# Patient Record
Sex: MALE
[De-identification: names, ages, dates, MRNs, and addresses within clinical notes are randomized per-mention and may not be internally consistent; named-entity substitution may affect disease eponyms.]

---

## 2021-06-02 ENCOUNTER — HOSPITAL ENCOUNTER (EMERGENCY)
Dept: HOSPITAL 97 - ER | Age: 21
Discharge: HOME | End: 2021-06-02
Payer: COMMERCIAL

## 2021-06-02 VITALS — OXYGEN SATURATION: 99 % | SYSTOLIC BLOOD PRESSURE: 105 MMHG | TEMPERATURE: 101.4 F | DIASTOLIC BLOOD PRESSURE: 64 MMHG

## 2021-06-02 DIAGNOSIS — U07.1: Primary | ICD-10-CM

## 2021-06-02 LAB — SARS-COV-2 RNA RESP QL NAA+PROBE: POSITIVE

## 2021-06-02 PROCEDURE — 99283 EMERGENCY DEPT VISIT LOW MDM: CPT

## 2021-06-02 PROCEDURE — 0240U: CPT

## 2021-06-02 NOTE — XMS REPORT
Continuity of Care Document

                             Created on:2021



Patient:ROLO MACIAS

Sex:Male

:2000

External Reference #:[id number]





Demographics







                          Address                   [address] [address]

 

                          Home Phone                [phone]

 

                          Mobile Phone              [phone]

 

                          Email Address             NONE

 

                          Preferred Language        en

 

                          Marital Status            Unknown

 

                          Jainism Affiliation     [id number]

 

                          Race                      Unknown

 

                          Additional Race(s)        Unavailable



                                                    White

 

                          Ethnic Group              Unknown









Author







                          Organization              [facility]

t

 

                          Address                   [address]



                                                    [place] [address]

 

                          Phone                     [phone]









Care Team Providers







                    Name                Role                Phone

 

                    Slade Clark DO   Attending Clinician [phone]

 

                    MACK Garcia MD        Attending Clinician [phone]









Problems

This patient has no known problems.



Allergies, Adverse Reactions, Alerts

This patient has no known allergies or adverse reactions.



Medications

This patient has no known medications.



Procedures

This patient has no known procedures.



Encounters







        Start   End     Encounter Admission Attending Care    Care    Encounter 

Source



        Date/Time Date/Time Type    Type    Clinicians Facility Department ID   

   

 

        2021 Patient         [facility]    [id number]

08 



        00:00:00 00:00:00 Outreach         [facility]  PRIMARY [id number]         



                                        [facility]    [id number]         



                                                [place] [phone]         



                                                        388             

 

        2020 Telephone         [facility]    [phone] [id number]

[id number] 



        00:00:00 00:00:00                 [facility] [id number]     

    



                                                [facility]     [id number]         



                                                [place]  [phone]         



                                                        156             

 

        2020 Telephone         [facility]    [phone] [id number]

[id number] 



        00:00:00 00:00:00                 [facility] [id number]     

    



                                                [facility]     [id number]         



                                                [place]  [phone]         



                                                        156             

 

        2020 Telephone         [facility]    [phone] [id number]

[id number] 



        00:00:00 00:00:00                 [facility] [id number]     

    



                                                [facility]     [id number]         



                                                [place]  [phone]         



                                                        156             







Results

This patient has no known results.

## 2021-06-02 NOTE — ER
Nurse's Notes                                                                                     

 [facility]                                                                 

Name: Gume Amos                                                                                 

Age: 21 yrs                                                                                       

Sex: Male                                                                                         

: 2000                                                                                   

MRN: [id number]                                                                                   

Arrival Date: 2021                                                                          

Time: 11:51                                                                                       

Account#: [id number]                                                                            

Bed 24                                                                                            

Private MD:                                                                                       

Diagnosis: Coronavirus infection, unspecified                                                     

                                                                                                  

Presentation:                                                                                     

                                                                                             

12:24 Chief complaint: Patient states: Fever, chills, stuffy nose, and body aches x 2 days.   ph  

      Coronavirus screen: congestion, fever, muscle pain, Client presents with at least one       

      sign or symptom that may indicate coronavirus-19. Standard/surgical mask placed on the      

      client. Provider contacted for isolation considerations. Ebola Screen: No symptoms or       

      risks identified at this time. Initial Sepsis Screen: Does the patient meet any 2           

      criteria? No. Patient's initial sepsis screen is negative. Does the patient have a          

      suspected source of infection? No. Patient's initial sepsis screen is negative. Risk        

      Assessment: Do you want to hurt yourself or someone else? Patient reports no desire to      

      harm self or others. Onset of symptoms was 2021.                                   

12:24 Method Of Arrival: Ambulatory                                                           ph  

12:24 Acuity: BOUBACAR 4                                                                           ph  

                                                                                                  

Historical:                                                                                       

- Allergies:                                                                                      

12:26 No Known Allergies;                                                                     ph  

- PMHx:                                                                                           

12:26 None;                                                                                   ph  

                                                                                                  

- Immunization history:: Adult Immunizations unknown.                                             

- Social history:: Smoking status: Patient denies any tobacco usage or history of.                

                                                                                                  

                                                                                                  

Screenin:18 Abuse screen: Denies threats or abuse. Nutritional screening: No deficits noted.        ap3 

      Tuberculosis screening: No symptoms or risk factors identified. Fall Risk None              

      identified.                                                                                 

                                                                                                  

Assessment:                                                                                       

14:17 General: Appears in no apparent distress. uncomfortable. General: Reports fever for     ap3 

      feeling ill for fatigue for. Pain: Complains of pain in GENERALIZED PAIN AND WEAKNESS.      

      Neuro: Level of Consciousness is awake, alert, obeys commands, Oriented to person,          

      place, time, situation. Cardiovascular: Denies chest pain. Respiratory: Reports cough       

      that is Airway is patent Respiratory effort is even, unlabored, Respiratory pattern is      

      regular, symmetrical. GI: No signs and/or symptoms were reported involving the              

      gastrointestinal system. : No signs and/or symptoms were reported regarding the           

      genitourinary system. Musculoskeletal: Reports weakness in GENERALIZED PAIN AND             

      WEAKNESS.                                                                                   

                                                                                                  

Vital Signs:                                                                                      

12:24  / 64; Pulse 101; Resp 18; Temp 101.4(O); Pulse Ox 99% on R/A; Weight 158.76 kg;  ph  

      Height 6 ft. 7 in. (200.66 cm);                                                             

12:24 Body Mass Index 39.43 (158.76 kg, 200.66 cm)                                            ph  

                                                                                                  

ED Course:                                                                                        

11:51 Patient arrived in ED.                                                                  ds1 

11:52 Elizabteh Smith FNP-C is [facility].                                                        kb  

11:52 Scott Steen MD is Attending Physician.                                            kb  

12:25 Triage completed.                                                                       ph  

12:26 Arm band placed on Patient placed in waiting room, Patient notified of wait time.       ph  

      Antipyretics given from triage as ordered by an ER provider. Labs ordered per protocol.     

      Drawn by ED staff.                                                                          

13:09 Berna Conway, RN is Primary Nurse.                                                    ph  

14:18 Patient has correct armband on for positive identification. Bed in low position. Call   ap3 

      light in reach. Side rails up X 1. Adult w/ patient. Pulse ox on. NIBP on. Door closed.     

      Noise minimized.                                                                            

15:29 No provider procedures requiring assistance completed. Patient did not have IV access   jl7 

      during this emergency room visit.                                                           

                                                                                                  

Administered Medications:                                                                         

12:35 Drug: Tylenol 1000 mg Route: PO;                                                        ph  

13:30 Drug: Ibuprofen 800 mg Route: PO;                                                       ap3 

                                                                                                  

                                                                                                  

Outcome:                                                                                          

14:56 Discharge ordered by MD.                                                                kb  

15:29 Discharged to home ambulatory.                                                          jl7 

15:29 Condition: stable                                                                           

15:29 Discharge instructions given to patient, Instructed on discharge instructions, follow       

      up and referral plans. Demonstrated understanding of instructions, follow-up care.          

15:29 Patient left the ED.                                                                    jl7 

                                                                                                  

Signatures:                                                                                       

Elizabeth Smith FNP-C                 FNP-Radha Pelletier                                ds1                                                  

Berna Conway, RN                      RN   ph                                                   

Jaquan Thomas RN RN   jl7                                                  

Elvie Rain RN RN   ap3                                                  

                                                                                                  

Corrections: (The following items were deleted from the chart)                                    

13:34 13:10 CORONAVIRUS+MR.LAB.BRZ drawn and sent. ph                                         EDMS

13:35 13:10 Influenza Screen (A \T\ B)+BA.LAB.BRZ drawn and sent. ph                            EDMS

                                                                                                  

**************************************************************************************************

## 2021-06-03 NOTE — EDPHYS
Physician Documentation                                                                           

 [facility]                                                                 

Name: Gume Amos                                                                                 

Age: 21 yrs                                                                                       

Sex: Male                                                                                         

: 2000                                                                                   

MRN: [id number]                                                                                   

Arrival Date: 2021                                                                          

Time: 11:51                                                                                       

Account#: [id number]                                                                            

Bed 24                                                                                            

Private MD:                                                                                       

ED Physician Scott Steen                                                                     

HPI:                                                                                              

                                                                                             

16:54 This 21 yrs old  Male presents to ER via Ambulatory with complaints of Fever,   kb  

      Runny Nose, Body Aches.                                                                     

16:54 The patient or guardian reports flu symptoms, low-grade fever. Onset: The               kb  

      symptoms/episode began/occurred yesterday. Severity of symptoms: At their worst the         

      symptoms were moderate, in the emergency department the symptoms are unchanged.             

      Modifying factors: The symptoms are alleviated by nothing, the symptoms are aggravated      

      by nothing. Associated signs and symptoms: Pertinent positives: fever, congestion,          

      headache. The patient has not experienced similar symptoms in the past. The patient has     

      not recently seen a physician. pt reports fever, congestion and headache since              

      yesterday.                                                                                  

                                                                                                  

Historical:                                                                                       

- Allergies:                                                                                      

12:26 No Known Allergies;                                                                     ph  

- PMHx:                                                                                           

12:26 None;                                                                                   ph  

                                                                                                  

- Immunization history:: Adult Immunizations unknown.                                             

- Social history:: Smoking status: Patient denies any tobacco usage or history of.                

                                                                                                  

                                                                                                  

ROS:                                                                                              

16:55 Respiratory: Negative for shortness of breath, cough, wheezing, and pleuritic chest     kb  

      pain.                                                                                       

16:55 Constitutional: Positive for fever.                                                         

16:55 ENT: Positive for rhinorrhea, sinus congestion.                                             

16:55 Neuro: Positive for headache.                                                               

16:55 All other systems are negative.                                                             

                                                                                                  

Exam:                                                                                             

16:55 Constitutional:  This is a well developed, well nourished patient who is awake, alert,  kb  

      and in no acute distress. Head/Face:  Normocephalic, atraumatic. ENT:  Moist Mucous         

      membranes Respiratory:  Respirations even and unlabored. No increased work of               

      breathing, no retractions or nasal flaring. Skin:  Warm, dry with normal turgor.            

      Normal color. MS/ Extremity:  Pulses equal, no cyanosis.  Neurovascular intact.  Full,      

      normal range of motion. Neuro:  Awake and alert, GCS 15, oriented to person, place,         

      time, and situation. Moves all extremities. Normal gait. Psych:  Awake, alert, with         

      orientation to person, place and time.  Behavior, mood, and affect are within normal        

      limits.                                                                                     

                                                                                                  

Vital Signs:                                                                                      

12:24  / 64; Pulse 101; Resp 18; Temp 101.4(O); Pulse Ox 99% on R/A; Weight 158.76 kg;  ph  

      Height 6 ft. 7 in. (200.66 cm);                                                             

12:24 Body Mass Index 39.43 (158.76 kg, 200.66 cm)                                            ph  

                                                                                                  

MDM:                                                                                              

13:22 Patient medically screened.                                                             kb  

16:53 Data reviewed: vital signs, nurses notes. Data interpreted: Pulse oximetry: on room air kb  

      is 99 %. Interpretation: normal. Counseling: I had a detailed discussion with the           

      patient and/or guardian regarding: the historical points, exam findings, and any            

      diagnostic results supporting the discharge/admit diagnosis, lab results, the need for      

      outpatient follow up, a family practitioner, to return to the emergency department if       

      symptoms worsen or persist or if there are any questions or concerns that arise at home.    

                                                                                                  

                                                                                             

14:56 Order name: COVID-19/FLU A+B; Complete Time: 15:00                                      EDMS

                                                                                                  

Administered Medications:                                                                         

12:35 Drug: Tylenol 1000 mg Route: PO;                                                        ph  

13:30 Drug: Ibuprofen 800 mg Route: PO;                                                       ap3 

                                                                                                  

                                                                                                  

Disposition:                                                                                      

18:32 Co-signature as Attending Physician, Scott Steen MD I agree with the assessment and tw4 

      plan of care.                                                                               

                                                                                                  

Disposition:                                                                                      

21 14:56 Discharged to Home. Impression: Coronavirus infection, unspecified.                

- Condition is Stable.                                                                            

- Discharge Instructions: Viral Respiratory Infection, Easy-To-Read, COVID-19.                    

                                                                                                  

- Medication Reconciliation Form, Thank You Letter, Antibiotic Education, Prescription            

  Opioid Use form.                                                                                

- Follow up: Emergency Department; When: As needed; Reason: Worsening of condition.               

  Follow up: Private Physician; When: 2 - 3 days; Reason: Recheck today's complaints,             

  Continuance of care, Re-evaluation by your physician.                                           

                                                                                                  

                                                                                                  

                                                                                                  

Signatures:                                                                                       

Dispatcher [employer]                           Elizabeth Thomas, KARMEN MITCHELL-Berna Decker, RN                      RN   ph                                                   

Jaquan Thomas, RN                        RN   jl7                                                  

Scott Steen MD MD   tw4                                                  

Elvie Rain RN                    RN   ap3                                                  

                                                                                                  

Corrections: (The following items were deleted from the chart)                                    

13:34 11:52 CORONAVIRUS+MR.LAB.BRZ ordered. [facility]

13:35 11:52 Influenza Screen (A \T\ B)+BA.LAB.BRZ ordered. [facility]                                 EDMS

15:29 14:56 2021 14:56 Discharged to Home. Impression: Coronavirus infection,           jl7 

      unspecified. Condition is Stable. Forms are Medication Reconciliation Form, Thank You       

      Letter, Antibiotic Education, Prescription Opioid Use. Follow up: Emergency Department;     

      When: As needed; Reason: Worsening of condition. Follow up: Private Physician; When: 2      

      - 3 days; Reason: Recheck today's complaints, Continuance of care, Re-evaluation by         

      your physician. kb                                                                          

                                                                                                  

**************************************************************************************************

## 2021-06-14 ENCOUNTER — HOSPITAL ENCOUNTER (EMERGENCY)
Dept: HOSPITAL 97 - ER | Age: 21
Discharge: HOME | End: 2021-06-14
Payer: COMMERCIAL

## 2021-06-14 VITALS — SYSTOLIC BLOOD PRESSURE: 147 MMHG | OXYGEN SATURATION: 98 % | TEMPERATURE: 97.8 F | DIASTOLIC BLOOD PRESSURE: 82 MMHG

## 2021-06-14 DIAGNOSIS — Z86.16: ICD-10-CM

## 2021-06-14 DIAGNOSIS — Z53.29: ICD-10-CM

## 2021-06-14 DIAGNOSIS — Z20.822: Primary | ICD-10-CM

## 2021-06-14 PROCEDURE — 99281 EMR DPT VST MAYX REQ PHY/QHP: CPT

## 2021-06-14 NOTE — EDPHYS
Physician Documentation                                                                           

 [facility]                                                                 

Name: Gume Amos                                                                                 

Age: 21 yrs                                                                                       

Sex: Male                                                                                         

: 2000                                                                                   

MRN: [id number]                                                                                   

Arrival Date: 2021                                                                          

Time: 14:03                                                                                       

Account#: [id number]                                                                            

Bed 12                                                                                            

Private MD:                                                                                       

ED Physician Olvin Waldron                                                                      

HPI:                                                                                              

                                                                                             

20:13 This 21 yrs old  Male presents to ER via Ambulatory with complaints of COVID    kb  

      Test.                                                                                       

20:13 Pt reports he tested positive 1-2 weeks ago. States his symptoms have resolved so he    kb  

      came in today to get another test to make sure it was negative now. Onset: The              

      symptoms/episode began/occurred 2 week(s) ago. Severity of symptoms: At their worst the     

      symptoms were mild in the emergency department the symptoms have resolved. The patient      

      has not experienced similar symptoms in the past. The patient has not recently seen a       

      physician.                                                                                  

                                                                                                  

Historical:                                                                                       

- Allergies:                                                                                      

14:10 No Known Allergies;                                                                     ss  

- Home Meds:                                                                                      

14:10 None [Active];                                                                          ss  

- PMHx:                                                                                           

14:10 None;                                                                                   ss  

- PSHx:                                                                                           

14:10 None;                                                                                   ss  

                                                                                                  

- Immunization history:: Adult Immunizations unknown.                                             

- Social history:: Smoking status: Patient denies any tobacco usage or history of.                

                                                                                                  

                                                                                                  

ROS:                                                                                              

20:13 Constitutional: Negative for fever, chills, and weight loss.                            kb  

20:13 All other systems are negative.                                                             

                                                                                                  

Exam:                                                                                             

20:13 Constitutional:  This is a well developed, well nourished patient who is awake, alert,  kb  

      and in no acute distress. Head/Face:  Normocephalic, atraumatic. ENT:  Moist Mucous         

      membranes Respiratory:  Respirations even and unlabored. No increased work of               

      breathing, no retractions or nasal flaring. Skin:  Warm, dry with normal turgor.            

      Normal color. MS/ Extremity:  Pulses equal, no cyanosis.  Neurovascular intact.  Full,      

      normal range of motion. Neuro:  Awake and alert, GCS 15, oriented to person, place,         

      time, and situation. Moves all extremities. Normal gait. Psych:  Awake, alert, with         

      orientation to person, place and time.  Behavior, mood, and affect are within normal        

      limits.                                                                                     

                                                                                                  

Vital Signs:                                                                                      

14:09  / 82; Pulse 75; Resp 15; Temp 97.8(TE); Pulse Ox 98% on R/A; Pain 0/10;          ss  

                                                                                                  

MDM:                                                                                              

14:13 Patient medically screened.                                                             kb  

20:12 Data reviewed: vital signs, nurses notes. Data interpreted: Pulse oximetry: on room air kb  

      is 98 %. Interpretation: normal. Counseling: I had a detailed discussion with the           

      patient and/or guardian regarding: the historical points, exam findings, and any            

      diagnostic results supporting the discharge/admit diagnosis, the need for outpatient        

      follow up, a family practitioner, to return to the emergency department if symptoms         

      worsen or persist or if there are any questions or concerns that arise at home.             

                                                                                                  

Administered Medications:                                                                         

No medications were administered                                                                  

                                                                                                  

                                                                                                  

Disposition:                                                                                      

14:24 Encounter for repeat covid test.                                                        kb  

06/15                                                                                             

07:14 Co-signature as Attending Physician, Olvin Waldron MD I agree with the assessment and  loni 

      plan of care.                                                                               

                                                                                                  

Disposition:                                                                                      

21 14:24 Discharged to Home. Impression: Encounter for screening, unspecified.              

- Condition is Stable.                                                                            

                                                                                                  

                                                                                                  

- Medication Reconciliation Form, Thank You Letter, Antibiotic Education, Prescription            

  Opioid Use form.                                                                                

- Follow up: Emergency Department; When: As needed; Reason: Worsening of condition.               

  Follow up: Private Physician; When: 2 - 3 days; Reason: Recheck today's complaints,             

  Continuance of care, Re-evaluation by your physician.                                           

                                                                                                  

                                                                                                  

                                                                                                  

Signatures:                                                                                       

Elizabeth Smith, FNP-C                 FNP-Ckb                                                   

Olvin Waldron MD MD cha Smirch, Shelby, RN                      RN   ss                                                   

                                                                                                  

Corrections: (The following items were deleted from the chart)                                    

                                                                                             

14:33 14:24 2021 14:24 Discharged to Home. Impression: Encounter for screening,         ss  

      unspecified. Condition is Stable. Forms are Medication Reconciliation Form, Thank You       

      Letter, Antibiotic Education, Prescription Opioid Use. Follow up: Emergency Department;     

      When: As needed; Reason: Worsening of condition. Follow up: Private Physician; When: 2      

      - 3 days; Reason: Recheck today's complaints, Continuance of care, Re-evaluation by         

      your physician. kb                                                                          

                                                                                                  

**************************************************************************************************

## 2021-06-14 NOTE — ER
Nurse's Notes                                                                                     

 [facility]                                                                 

Name: Gume Amos                                                                                 

Age: 21 yrs                                                                                       

Sex: Male                                                                                         

: 2000                                                                                   

MRN: [id number]                                                                                   

Arrival Date: 2021                                                                          

Time: 14:03                                                                                       

Account#: [id number]                                                                            

Bed 12                                                                                            

Private MD:                                                                                       

Diagnosis: Encounter for screening, unspecified                                                   

                                                                                                  

Presentation:                                                                                     

                                                                                             

14:09 Chief complaint: Patient states: "I had COVID a while back and I just need to see if I  ss  

      still have it." PT has no complaints at this time. Coronavirus screen: Client denies        

      travel out of the [place] in the last 14 days. Ebola Screen: Patient denies exposure to        

      infectious person. Patient denies travel to an Ebola-affected area in the 21 days           

      before illness onset. Initial Sepsis Screen: Does the patient meet any 2 criteria? No.      

      Patient's initial sepsis screen is negative. Does the patient have a suspected source       

      of infection? No. Patient's initial sepsis screen is negative. Risk Assessment: Do you      

      want to hurt yourself or someone else? Patient reports no desire to harm self or            

      others. Onset of symptoms is unknown.                                                       

14:09 Method Of Arrival: Ambulatory                                                           ss  

14:09 Acuity: BOUBACAR 5                                                                           ss  

                                                                                                  

Historical:                                                                                       

- Allergies:                                                                                      

14:10 No Known Allergies;                                                                     ss  

- Home Meds:                                                                                      

14:10 None [Active];                                                                          ss  

- PMHx:                                                                                           

14:10 None;                                                                                   ss  

- PSHx:                                                                                           

14:10 None;                                                                                   ss  

                                                                                                  

- Immunization history:: Adult Immunizations unknown.                                             

- Social history:: Smoking status: Patient denies any tobacco usage or history of.                

                                                                                                  

                                                                                                  

Screenin:11 Abuse screen: Denies threats or abuse. Denies injuries from another. Nutritional        ss  

      screening: No deficits noted. Tuberculosis screening: Never had TB. Fall Risk None          

      identified.                                                                                 

                                                                                                  

Vital Signs:                                                                                      

14:09  / 82; Pulse 75; Resp 15; Temp 97.8(TE); Pulse Ox 98% on R/A; Pain 0/10;          ss  

                                                                                                  

ED Course:                                                                                        

14:03 Patient arrived in ED.                                                                  mr  

14:10 Triage completed.                                                                       ss  

14:10 Arm band placed on right wrist.                                                         ss  

14:11 Patient has correct armband on for positive identification. Bed in low position. Call   ss  

      light in reach.                                                                             

14:13 [facility]                                                        kb  

14:13 Olvin Waldron MD is Attending Physician.                                             kb  

14:32 Lulú Perrin, RN is Primary Nurse.                                                    ss  

14:32 No provider procedures requiring assistance completed. Patient did not have IV access   ss  

      during this emergency room visit.                                                           

                                                                                                  

Administered Medications:                                                                         

No medications were administered                                                                  

                                                                                                  

                                                                                                  

Outcome:                                                                                          

14:24 Discharge ordered by MD.                                                                kb  

14:32 Medical screen evaluation completed per provider. Patient declined treatment.           ss  

14:32 Condition: good                                                                             

14:32 Discharge instructions given to patient, Instructed on discharge instructions, follow       

      up and referral plans. Demonstrated understanding of instructions, follow-up care.          

14:33 Patient left the ED.                                                                    ss  

                                                                                                  

Signatures:                                                                                       

Elizabeth Smith, FNP-C                 FNP-Ckb                                                   

Tiarra Alexander                                                   

Lulú Perrin, RN                      RN   ss                                                   

                                                                                                  

Corrections: (The following items were deleted from the chart)                                    

14:11 14:09  / 82; ss                                                                   ss  

14:11 14:11 No provider procedures requiring assistance completed. ss                         ss  

14:11 14:11 Patient did not have IV access during this emergency room visit. ss               ss  

                                                                                                  

**************************************************************************************************

## 2022-01-07 ENCOUNTER — HOSPITAL ENCOUNTER (EMERGENCY)
Dept: HOSPITAL 97 - ER | Age: 22
Discharge: HOME | End: 2022-01-07
Payer: COMMERCIAL

## 2022-01-07 VITALS — DIASTOLIC BLOOD PRESSURE: 108 MMHG | OXYGEN SATURATION: 100 % | TEMPERATURE: 98.9 F | SYSTOLIC BLOOD PRESSURE: 160 MMHG

## 2022-01-07 DIAGNOSIS — U07.1: Primary | ICD-10-CM

## 2022-01-07 LAB — SARS-COV-2 RNA RESP QL NAA+PROBE: POSITIVE

## 2022-01-07 PROCEDURE — 99281 EMR DPT VST MAYX REQ PHY/QHP: CPT

## 2022-01-07 PROCEDURE — 87070 CULTURE OTHR SPECIMN AEROBIC: CPT

## 2022-01-07 PROCEDURE — 87081 CULTURE SCREEN ONLY: CPT

## 2022-01-07 PROCEDURE — 0240U: CPT

## 2022-01-07 NOTE — ER
Nurse's Notes                                                                                     

 [facility]                                                                 

Name: Gume Amos                                                                                 

Age: 21 yrs                                                                                       

Sex: Male                                                                                         

: 2000                                                                                   

MRN: [id number]                                                                                   

Arrival Date: 2022                                                                          

Time: 17:33                                                                                       

Account#: [id number]                                                                            

Bed DIS1                                                                                          

Private MD:                                                                                       

Diagnosis: SARS-associated coronavirus as the cause of diseases classified elsewhere              

                                                                                                  

Presentation:                                                                                     

                                                                                             

17:33 Chief complaint: Patient states: for about 2 days. my throat is sore. i have a          tw2 

      headache. runny nose as well. one episode of diarrhea. Coronavirus screen: diarrhea,        

      runny nose, sore throat, Client presents with at least one sign or symptom that may         

      indicate coronavirus-19. Standard/surgical mask placed on the client. Provider              

      contacted for isolation considerations. Ebola Screen: Patient denies travel to an           

      Ebola-affected area in the 21 days before illness onset. Initial Sepsis Screen: Does        

      the patient meet any 2 criteria? No. Patient's initial sepsis screen is negative. Does      

      the patient have a suspected source of infection? No. Patient's initial sepsis screen       

      is negative. Risk Assessment: Do you want to hurt yourself or someone else? Patient         

      reports no desire to harm self or others. Onset of symptoms was 2022.           

17:33 Method Of Arrival: Ambulatory                                                           tw2 

17:33 Acuity: BOUBACAR 4                                                                           tw2 

                                                                                                  

Triage Assessment:                                                                                

17:36 General: Appears in no apparent distress. obese, well groomed, Behavior is calm,        tw2 

      cooperative, appropriate for age. Pain: Complains of pain in uvula, left aspect of          

      posterior pharynx and right aspect of posterior pharynx.                                    

                                                                                                  

Historical:                                                                                       

- Allergies:                                                                                      

17:35 No Known Allergies;                                                                     tw2 

- Home Meds:                                                                                      

17:35 None [Active];                                                                          tw2 

- PMHx:                                                                                           

17:35 None;                                                                                   tw2 

- PSHx:                                                                                           

17:35 Appendectomy;                                                                           tw2 

                                                                                                  

- Immunization history:: Client reports having NOT received the Covid vaccine.                    

- Social history:: Smoking status: Patient denies any tobacco usage or history of.                

                                                                                                  

                                                                                                  

Screenin:43 Abuse screen: Denies threats or abuse. Nutritional screening: No deficits noted.        vc1 

      Tuberculosis screening: No symptoms or risk factors identified. Fall Risk None              

      identified.                                                                                 

                                                                                                  

Assessment:                                                                                       

20:43 Reassessment: Patient and/or family updated on plan of care and expected duration. Pain vc1 

      level reassessed. Patient is alert, oriented x 3, equal unlabored respirations, skin        

      warm/dry/pink. Patient states symptoms have not improved. General: Appears in no            

      apparent distress. ill, Behavior is calm, cooperative, appropriate for age. Pain:           

      Complains of pain in mouth and right aspect of posterior pharynx and left aspect of         

      posterior pharynx. Neuro: No deficits noted. Respiratory: No deficits noted. EENT:          

      Reports nasal congestion nasal discharge pain when swallowing.                              

                                                                                                  

Vital Signs:                                                                                      

17:33  / 108; Pulse 95; Resp 18; Temp 98.9(O); Pulse Ox 100% on R/A;                    tw2 

                                                                                                  

ED Course:                                                                                        

17:33 Patient arrived in ED.                                                                  as  

17:35 Triage completed.                                                                       tw2 

17:36 Arm band placed on.                                                                     tw2 

19:14 Olvin Guillaume PA is PHCP.                                                                cp  

19:14 Ravi Caicedo MD is Attending Physician.                                              cp  

20:44 Patient has correct armband on for positive identification.                             vc1 

21:17 No provider procedures requiring assistance completed. Patient did not have IV access   vc1 

      during this emergency room visit.                                                           

                                                                                                  

Administered Medications:                                                                         

No medications were administered                                                                  

                                                                                                  

                                                                                                  

Outcome:                                                                                          

21:13 Discharge ordered by MD.                                                                cp  

21:18 Discharged to home ambulatory, with family.                                             vc1 

21:18 Condition: good                                                                             

21:18 Discharge instructions given to patient, family, Instructed on discharge instructions,      

      follow up and referral plans. medication usage, Demonstrated understanding of               

      instructions, follow-up care, medications.                                                  

21:18 Patient left the ED.                                                                    vc1 

                                                                                                  

Signatures:                                                                                       

Lolly Sorenson                             as                                                   

Olvin Guillaume PA PA cp Wise, Tara, RN                          RN   tw2                                                  

Yamliet Wodos RN                    RN   vc1                                                  

                                                                                                  

**************************************************************************************************

## 2022-01-07 NOTE — XMS REPORT
Continuity of Care Document

                           Created on:2022



Patient:ROLO MACIAS

Sex:Male

:2000

External Reference #:[id number]





Demographics







                          Address                   [address], [place] [address]

 

                          Home Phone                [phone]

 

                          Mobile Phone              [phone]

 

                          Email Address             [email or web address]

 

                          Preferred Language        en

 

                          Marital Status            Unknown

 

                          Taoist Affiliation     [id number]

 

                          Race                      Unknown

 

                          Additional Race(s)        Unavailable



                                                    White

 

                          Ethnic Group              Unknown









Author







                          Organization              [facility]

t

 

                          Address                   [address]



                                                    [place] [address]

 

                          Phone                     [phone]









Care Team Providers







                    Name                Role                Phone

 

                    Slade Clark DO   Attending Clinician [phone]

 

                    MACK Garcia MD        Attending Clinician [phone]

 

                    MACK GARCIA           Attending Clinician Unavailable









Payers







           Payer Name Policy Type Policy Number Effective Date Expiration Date [employer]            [id number]  [id number]            



           HEALTH                           00:00:00              







Problems

This patient has no known problems.



Allergies, Adverse Reactions, Alerts







       Allergy Allergy Status Severity Reaction(s) Onset  Inactive Treating Comm

ents 

Source



       Name   Type                        Date   Date   Clinician        

 

       NO KNOWN Drug   Active                                           [facility]



       ALLERGIE Class                                                   ity of



       S                                                              [facility]







Medications

This patient has no known medications.



Procedures

This patient has no known procedures.



Encounters







        Start   End     Encounter Admission Attending Care    Care    Encounter 

Source



        Date/Time Date/Time Type    Type    Clinicians Facility Department ID   

   

 

        2021 Patient         Eduardo  [facility]    [id number]

08 



        00:00:00 00:00:00 Outreach         Mikhail  PRIMARY [id number]         



                                        Slade  [facility]    [id number]         



                                                [place] [phone]         



                                                        388             

 

        2020 [facility]    [phone]

[id number] 



        00:00:00 00:00:00                 [facility] [id number]     

    



                                                BAY     [id number]         



                                                [place]  [phone]         



                                                        156             

 

        2020 Telephone         [facility]    [phone]

[id number] 



        00:00:00 00:00:00                 [facility] [id number]     

    



                                                BAY     [id number]         



                                                [place]  [phone]         



                                                        156             

 

        2020 Telephone         Jose    [facility]    [phone]

[id number] 



        00:00:00 00:00:00                 Uzair MEAD [facility] [id number]     

    



                                                BAY     [id number]         



                                                [place]  [phone]         



                                                        156             

 

        2020 Outpatient R       JOSE    [facility]    [id number]

247 [facility]



        08:10:00 08:10:00                 UZAIR salter of



                                                                        [facility]







Results

This patient has no known results.

## 2022-01-07 NOTE — EDPHYS
Physician Documentation                                                                           

 [facility]                                                                 

Name: Gume Amos                                                                                 

Age: 21 yrs                                                                                       

Sex: Male                                                                                         

: 2000                                                                                   

MRN: [id number]                                                                                   

Arrival Date: 2022                                                                          

Time: 17:33                                                                                       

Account#: [id number]                                                                            

Bed DIS1                                                                                          

Private MD:                                                                                       

ED Physician Ravi Caicedo                                                                       

HPI:                                                                                              

                                                                                             

21:00 This 21 yrs old  Male presents to ER via Ambulatory with complaints of Sore     cp  

      Throat, Runny Nose, Headache.                                                               

21:00 The patient or guardian reports cough, that is intermittent. Onset: The                 cp  

      symptoms/episode began/occurred 2 day(s) ago. Associated signs and symptoms: Pertinent      

      positives: fever, sore throat, headache, Pertinent negatives: chest pain, diarrhea,         

      vomiting. Patient reports he is not vaccinated against COVID-19.                            

                                                                                                  

Historical:                                                                                       

- Allergies:                                                                                      

17:35 No Known Allergies;                                                                     tw2 

- Home Meds:                                                                                      

17:35 None [Active];                                                                          tw2 

- PMHx:                                                                                           

17:35 None;                                                                                   tw2 

- PSHx:                                                                                           

17:35 Appendectomy;                                                                           tw2 

                                                                                                  

- Immunization history:: Client reports having NOT received the Covid vaccine.                    

- Social history:: Smoking status: Patient denies any tobacco usage or history of.                

                                                                                                  

                                                                                                  

ROS:                                                                                              

21:05 Constitutional: Negative for fever, poor PO intake.                                     cp  

21:05 Eyes: Negative for injury, pain, redness, and discharge.                                cp  

21:05 ENT: Positive for rhinorrhea, sore throat, Negative for drainage from ear(s), ear pain,     

      difficulty swallowing, difficulty handling secretions.                                      

21:05 Respiratory: Positive for cough, Negative for shortness of breath, wheezing.                

21:05 Neuro: Positive for headache, Negative for altered mental status, weakness.                 

21:05 All other systems are negative.                                                             

                                                                                                  

Exam:                                                                                             

21:08 Constitutional: The patient appears in no acute distress, alert, awake, non-toxic, well cp  

      developed, well nourished, obese.                                                           

21:08 Head/Face:  Normocephalic, atraumatic.                                                  cp  

21:08 Eyes: Periorbital structures: appear normal, Conjunctiva: normal, no exudate, no            

      injection, Sclera: no appreciated abnormality, Lids and lashes: appear normal,              

      bilaterally.                                                                                

21:08 ENT: External ear(s): are unremarkable, Ear canal(s): are normal, clear, TM's:              

      dullness, bilaterally, Nose: is normal, Mouth: Lips: moist, Oral mucosa: moist,             

      Posterior pharynx: Airway: no evidence of obstruction, patent, Tonsils: no enlargement,     

      no exudate, erythema, that is mild, exudate, is not appreciated.                            

21:08 Neck: ROM/movement: is normal, is supple, no meningismus, no nuchal rigidity, Lymph         

      nodes: no appreciated lymphadenopathy.                                                      

21:08 Chest/axilla: Inspection: normal.                                                           

21:08 Cardiovascular: Rate: normal, Rhythm: regular.                                              

21:08 Respiratory: the patient does not display signs of respiratory distress,  Respirations:     

      normal, no use of accessory muscles, no retractions, labored breathing, is not present,     

      Breath sounds: are clear throughout, no decreased breath sounds, no stridor, no             

      wheezing.                                                                                   

21:08 Abdomen/GI: Exam negative for discomfort, distension, guarding, Inspection: abdomen         

      appears normal.                                                                             

21:08 Neuro: Orientation: Mentation: is normal, Motor: moves all fours, strength is normal,       

      Sensation: is normal.                                                                       

                                                                                                  

Vital Signs:                                                                                      

17:33  / 108; Pulse 95; Resp 18; Temp 98.9(O); Pulse Ox 100% on R/A;                    tw2 

                                                                                                  

MDM:                                                                                              

20:44 Patient medically screened.                                                             cp  

21:10 Differential Diagnosis: Bronchitis Influenza Sinusitis Pharyngitis Otitis Media         cp  

      Pneumonia Other strep throat, COVID-19.                                                     

21:13 Data reviewed: vital signs, nurses notes, lab test result(s), and as a result, I will   cp  

      discharge patient.                                                                          

21:13 Counseling: I had a detailed discussion with the patient and/or guardian regarding: the cp  

      historical points, exam findings, and any diagnostic results supporting the                 

      discharge/admit diagnosis, lab results, to return to the emergency department if            

      symptoms worsen or persist or if there are any questions or concerns that arise at          

      home. ED course: VSS. Patient appears non-toxic and no signs of respiratory distress.       

      Will discharge to home for continued monitoring.                                            

                                                                                                  

                                                                                             

17:37 Order name: COVID-19/FLU A+B (Document "Date of Onset" if Symptomatic)                  tw2 

                                                                                             

17:38 Order name: Strep; Complete Time: 21:02                                                 tw2 

                                                                                             

17:38 Order name: COVID-19/FLU A+B; Complete Time: 21:02                                      EDMS

                                                                                             

18:16 Order name: Throat Culture                                                              EDMS

                                                                                                  

Administered Medications:                                                                         

No medications were administered                                                                  

                                                                                                  

                                                                                                  

Disposition:                                                                                      

                                                                                             

02:48 Co-signature as Attending Physician, Ravi Caicedo MD.                                  kdr 

                                                                                                  

Disposition Summary:                                                                              

22 21:13                                                                                    

Discharge Ordered                                                                                 

      Location: Home                                                                          cp  

      Problem: new                                                                            cp  

      Symptoms: are unchanged                                                                 cp  

      Condition: Stable                                                                       cp  

      Diagnosis                                                                                   

        - SARS-associated coronavirus as the cause of diseases classified elsewhere           cp  

      Followup:                                                                               cp  

        - With: Private Physician                                                                  

        - When: 2 - 3 days                                                                         

        - Reason: Worsening of condition                                                           

      Discharge Instructions:                                                                     

        - Discharge Summary Sheet                                                             cp  

        - Aspirin and Your Heart                                                              cp  

        - COVID-19                                                                            cp  

        - Things to Know about the COVID-19 Pandemic - [facility]                                    cp  

        - 10 Things You Can Do to Manage Your COVID-19 Symptoms at Home - [facility]                 cp  

        - COVID-19: Quarantine vs. Isolation - [facility]                                            cp  

        - Prevent the Spread of COVID-19 if You Are Sick - [facility]                                cp  

      Forms:                                                                                      

        - Medication Reconciliation Form                                                      cp  

        - Thank You Letter                                                                    cp  

        - Antibiotic Education                                                                cp  

        - Prescription Opioid Use                                                             cp  

      Prescriptions:                                                                              

        - Ibuprofen 800 mg Oral Tablet                                                             

            - take 1 tablet by ORAL route every 8 hours As needed take with food; 30 tablet;  cp  

      Refills: 0, Product Selection Permitted                                                     

Signatures:                                                                                       

Dispatcher [employer]                           EDMS                                                 

Ravi Caicedo MD MD   kdr                                                  

Olvin Guillaume PA                         PA   cp                                                   

Crystal Hannah RN                          RN   tw2                                                  

                                                                                                  

**************************************************************************************************

## 2023-03-19 ENCOUNTER — HOSPITAL ENCOUNTER (EMERGENCY)
Dept: HOSPITAL 97 - ER | Age: 23
LOS: 1 days | Discharge: HOME | End: 2023-03-20
Payer: COMMERCIAL

## 2023-03-19 DIAGNOSIS — Z20.822: ICD-10-CM

## 2023-03-19 DIAGNOSIS — R11.2: ICD-10-CM

## 2023-03-19 DIAGNOSIS — L02.411: Primary | ICD-10-CM

## 2023-03-19 DIAGNOSIS — R19.7: ICD-10-CM

## 2023-03-19 PROCEDURE — 10060 I&D ABSCESS SIMPLE/SINGLE: CPT

## 2023-03-19 PROCEDURE — 81015 MICROSCOPIC EXAM OF URINE: CPT

## 2023-03-19 PROCEDURE — 81003 URINALYSIS AUTO W/O SCOPE: CPT

## 2023-03-19 PROCEDURE — 85025 COMPLETE CBC W/AUTO DIFF WBC: CPT

## 2023-03-19 PROCEDURE — 36415 COLL VENOUS BLD VENIPUNCTURE: CPT

## 2023-03-19 PROCEDURE — 87070 CULTURE OTHR SPECIMN AEROBIC: CPT

## 2023-03-19 PROCEDURE — 80053 COMPREHEN METABOLIC PANEL: CPT

## 2023-03-19 PROCEDURE — 87081 CULTURE SCREEN ONLY: CPT

## 2023-03-19 PROCEDURE — 74177 CT ABD & PELVIS W/CONTRAST: CPT

## 2023-03-19 PROCEDURE — 0241U: CPT

## 2023-03-19 PROCEDURE — 83690 ASSAY OF LIPASE: CPT

## 2023-03-19 NOTE — XMS REPORT
Continuity of Care Document

                            Created on:2023



Patient:ROLO MACIAS

Sex:Male

:2000

External Reference #:[id number]





Demographics







                          Address                   [address] [address]

 

                          Home Phone                [phone]

 

                          Mobile Phone              [phone]

 

                          Email Address             [email or web address]

 

                          Preferred Language        en

 

                          Marital Status            Unknown

 

                          Yarsani Affiliation     Unknown

 

                          Race                      Unknown

 

                          Additional Race(s)        Unavailable



                                                    White

 

                          Ethnic Group              Unknown









Author







                          Organization              [facility]

t

 

                          Address                   [address] [place] [address] [address] [place] [address]

 

                          Phone                     [phone]









Care Team Providers







                    Name                Role                Phone

 

                    Mikhail Clark DO Attending Clinician [phone]

 

                    Uzair Garcia MD Attending Clinician [phone]

 

                    UZAIR GARCIA    Attending Clinician Unavailable









Payers







           Payer Name Policy Type Policy Number Effective Date Expiration Date [employer]            [id number]  [id number]            



           HEALTH                           00:00:00              







Problems

This patient has no known problems.



Allergies, Adverse Reactions, Alerts







       Allergy Allergy Status Severity Reaction(s) Onset  Inactive Treating Comm

ents 

Source



       Name   Type                        Date   Date   Clinician        

 

       NO KNOWN Drug   Active                                           [facility]



       ALLERGIE Class                                                   ity [facility]







Medications

This patient has no known medications.



Procedures

This patient has no known procedures.



Encounters







        Start   End     Encounter Admission Attending Care    Care    Encounter 

Source



        Date/Time Date/Time Type    Type    Clinicians Facility Department ID   

   

 

        2021 Patient         Eduardo  [facility]    [id number]

08 



        00:00:00 00:00:00 Outreach         Mikhail  PRIMARY [id number]         



                                        [place]  [facility]    [id number]         



                                                [place] [phone]         



                                                        388             

 

        2020 Telephone         Jose    [facility]    [phone]

2209 



        00:00:00 00:00:00                 Uzair [facility] [id number]     

    



                                                BAY     [id number]         



                                                [place]  [phone]         



                                                        156             

 

        2020 Telephone         Jose    [facility]    [phone] [id number]

6579 



        00:00:00 00:00:00                 Uzair [facility] [id number]     

    



                                                BAY     [id number]         



                                                [place]  [phone]         



                                                        156             

 

        2020 Telephone         Jose    [facility]    [phone] [id number]

4770 



        00:00:00 00:00:00                 Uzair [facility] [id number]     

    



                                                BAY     [id number]         



                                                [place]  [phone]         



                                                        156             

 

        2020 Outpatient R       JOSE    [facility]    [id number]

[address] [facility]



        08:10:00 08:10:00                 UZAIR [facility]







Results







           Test Description Test Time  Test Comments Results    Result Comments 

Source









                    CBC W/AUTO DIFF WITH PLATELETS 2022 06:22:27 









                      Test Item  Value      Reference Range Interpretation Comme

nts









             WBC (test code = 1001) 6.6 K/UL     3.5-11.0                  

 

             RBC (test code = 1002) 4.79 M/UL    4.50-6.10                 

 

             HEMOGLOBIN (test code = 1003) 13.2 G/DL    13.5-17.0    L          

  

 

             HEMATOCRIT (test code = 1004) 39.9 %       40.0-51.0    L          

  

 

             MCV (test code = 1005) 83.3 fL      80.0-99.0                 

 

             MCH (test code = 1006) 27.6 PG      25.0-33.0                 

 

             MCHC (test code = 1007) 33.1 G/DL    31.0-36.0                 

 

             RDW (test code = 1038) 12.4 %       11.5-15.0                 

 

             NEUTROPHILS (test code = 61.0 %                                 



             1008)                                               

 

             LYMPHOCYTES (test code = 28.4 %                                 



             1010)                                               

 

             MONOCYTES (test code = 1011) 8.2 %                                 

 

 

             EOSINOPHILS (test code = 1.4 %                                  



             1012)                                               

 

             BASOPHILS (test code = 1013) 0.8 %                                 

 

 

             IMMATURE GRANULOCYTES (test 0.2 %                                  



             code = 1036)                                        

 

             NUCLEATED RBCS (test code = 0.0 /100 WBC'S See_Comment             

   [Automated message] The



             1065)                                               system which ge

nerated this



                                                                 result transmit

david



                                                                 reference range

: 0.0. The



                                                                 reference range

 was not



                                                                 used to interpr

et this



                                                                 result as pancho

l/abnormal.

 

             PLATELET COUNT (test code = 357 K/UL     130-400                   



             1015)                                               

 

             ABSOLUTE NEUTROPHILS (test 4.01 K/UL    1.50-7.50                 



             code = 1066)                                        

 

             ABSOLUTE LYMPHOCYTES (test 1.86 K/UL    1.00-4.00                 



             code = 1067)                                        

 

             ABSOLUTE MONOCYTES (test code 0.54 K/UL    0.20-1.00               

  



             = 1068)                                             

 

             ABSOLUTE EOSINOPHILS (test 0.09 K/UL    0.00-0.50                 



             code = 1040)                                        

 

             ABSOLUTE BASOPHILS (test code 0.05 K/UL    0.00-0.20               

  



             = 1069)                                             

 

             ABS IMMATURE GRANULOCYTES 0.01 K/UL    0.00-0.10                 



             (test code = 1020)                                        

 

             ABS NUCLEATED RBCS (test code 0.00 K/UL    0.00-0.11               

  



             = 76629)                                            



COMPREHENSIVE METABOLIC [id number] 04:17:29





             Test Item    Value        Reference Range Interpretation Comments

 

             GLUCOSE (test code = 84 MG/DL     70-99                     



             )                                               

 

             BUN (test code = 12 MG/DL     6-20                      



             )                                               

 

             CREATININE (test 0.75 MG/DL   0.80-1.40    L            



             code = 2214)                                        

 

             eGFR ( CKD-EPI) 132          >60                       



             (test code = 09539) ML/MIN/1.73                            

 

             CALC BUN/CREAT (test 16 RATIO     -                      



             code = 2235)                                        

 

             SODIUM (test code = 143 MEQ/L    133-146                   



             )                                               

 

             POTASSIUM (test code 4.2 MEQ/L    3.5-5.4                   



             = )                                             

 

             CHLORIDE (test code 103 MEQ/L                        



             = 2215)                                             

 

             CARBON DIOXIDE (test 26 MEQ/L     19-31                     



             code = 2206)                                        

 

             CALCIUM (test code = 9.7 MG/DL    8.5-10.5                  



             )                                               

 

             PROTEIN, TOTAL (test 7.4 G/DL     6.1-8.3                   



             code = 2229)                                        

 

             ALBUMIN (test code = 4.6 G/DL     3.5-5.2                   



             )                                               

 

             CALC GLOBULIN (test 2.8 G/DL     1.9-3.7                   



             code = 2240)                                        

 

             CALC A/G RATIO (test 1.6 RATIO    1.0-2.6                   



             code = 2234)                                        

 

             BILIRUBIN, TOTAL 0.4 MG/DL    See_Comment                [Automated

 message]



             (test code = 2207)                                        The syste

m which



                                                                 generated this



                                                                 result transmit

david



                                                                 reference range

:



                                                                 <=1.2. The refe

rence



                                                                 range was not u

sed



                                                                 to interpret th

is



                                                                 result as



                                                                 normal/abnormal

.

 

             ALKALINE PHOSPHATASE 197 U/L             H            



             (test code = 2204)                                        

 

             AST (test code = 20 U/L       50                      



             )                                               

 

             ALT (test code = 26 U/L       50                      



             )                                               



LIPID [id number] 04:17:29





             Test Item    Value        Reference Range Interpretation Comments

 

             CHOLESTEROL (test 92 MG/DL     <200                      



             code = 2210)                                        

 

             TRIGLYCERIDES (test 161 MG/DL    <150         H            



             code = 2232)                                        

 

             HDL CHOLESTEROL (test 32 MG/DL     >39          L            



             code = 2220)                                        

 

             CALC LDL CHOL (test 36 MG/DL     <100                       NOTE: C

ALCULATED LDL



             code = 2237)                                        IS BASED ON



                                                                 CHERELLE-CORDON 

METHOD



                                                                 WHICHINCLUDES



                                                                 ADJUSTABLE



                                                                 TRIGLYCERIDE:VL

DL



                                                                 CHOLESTEROL RAT

IO.THIS



                                                                 FACTOR VARIES B

Y



                                                                 MEASURED TRIGLY

CERIDE



                                                                 AND NON-HDLCHOL

ESTEROL



                                                                 CONCENTRATIONS 

WITH



                                                                 INCREASED CALCU

LATED



                                                                 LDL SEENIN HIGH

ER



                                                                 TRIGLYCERIDE OR

 LOWER



                                                                 NON-HDL SPECIME

NS. FOR



                                                                 MOREINFORMATION

, SEE



                                                                 CLIENT ANNOUNCE

MENT AT



                                                                 [email or web address]



                                                                 /[email or web address]

 

             RISK RATIO LDL/HDL 1.13 RATIO   <3.55                      UNLESS O

THERWISE



             (test code = 2238)                                        INDICATED

, ALL TESTING



                                                                 PERFORMED [facility]



                                                                 PATHOLOGY



                                                                 [facility]ATOR[facility]

[place].



                                                                 [address] [place] [address] [id number] [place] [address]



                                                                 DIRECTOR: LAURA HAIDER M.D.

 CLIA



                                                                 NUMBER [id number]

[id number]



                                                                 ACCREDITATION N

O.



                                                                 [id number]

## 2023-03-20 VITALS — TEMPERATURE: 99.7 F | SYSTOLIC BLOOD PRESSURE: 150 MMHG | DIASTOLIC BLOOD PRESSURE: 76 MMHG

## 2023-03-20 VITALS — OXYGEN SATURATION: 100 %

## 2023-03-20 LAB
ALBUMIN SERPL BCP-MCNC: 4 G/DL (ref 3.4–5)
ALP SERPL-CCNC: 141 U/L (ref 45–117)
ALT SERPL W P-5'-P-CCNC: 55 U/L (ref 16–61)
AST SERPL W P-5'-P-CCNC: 19 U/L (ref 15–37)
BUN BLD-MCNC: 14 MG/DL (ref 7–18)
GLUCOSE SERPLBLD-MCNC: 111 MG/DL (ref 74–106)
HCT VFR BLD CALC: 40.2 % (ref 39.6–49)
LIPASE SERPL-CCNC: 21 U/L (ref 13–75)
LYMPHOCYTES # SPEC AUTO: 1.1 K/UL (ref 0.7–4.9)
MCV RBC: 82.9 FL (ref 80–100)
PMV BLD: 7.9 FL (ref 7.6–11.3)
POTASSIUM SERPL-SCNC: 3.4 MEQ/L (ref 3.5–5.1)
RBC # BLD: 4.85 M/UL (ref 4.33–5.43)
SARS-COV-2 RNA RESP QL NAA+PROBE: NEGATIVE

## 2023-03-20 PROCEDURE — 0H9BXZZ DRAINAGE OF RIGHT UPPER ARM SKIN, EXTERNAL APPROACH: ICD-10-PCS

## 2023-03-20 NOTE — ER
Nurse's Notes                                                                                     

 [facility]                                                                 

Name: Gume Amos                                                                                 

Age: 22 yrs                                                                                       

Sex: Male                                                                                         

: 2000                                                                                   

MRN: [id number]                                                                                   

Arrival Date: 2023                                                                          

Time: 23:42                                                                                       

Account#: [id number]                                                                            

Bed 15                                                                                            

Private MD:                                                                                       

Diagnosis: Nausea with vomiting, unspecified;Diarrhea, unspecified;Cutaneous abscess of right     

  axilla                                                                                          

                                                                                                  

Presentation:                                                                                     

                                                                                             

23:47 Chief complaint: Patient states: "I don't feel good, I have diarrhea and I'm throwing   as6 

      up and I have thing bump under my arm". Coronavirus screen: At this time, the client        

      does not indicate any symptoms associated with coronavirus-19. Ebola Screen: No             

      symptoms or risks identified at this time. Initial Sepsis Screen: Does the patient meet     

      any 2 criteria? No. Patient's initial sepsis screen is negative. Does the patient have      

      a suspected source of infection? No. Patient's initial sepsis screen is negative. Risk      

      Assessment: Do you want to hurt yourself or someone else? Patient reports no desire to      

      harm self or others. Onset of symptoms was 2023.                                  

23:47 Method Of Arrival: Ambulatory                                                           as6 

23:47 Acuity: BOUBACAR 3                                                                           as6 

                                                                                                  

Historical:                                                                                       

- Allergies:                                                                                      

23:51 No Known Allergies;                                                                     as6 

- PMHx:                                                                                           

23:51 None;                                                                                   as6 

- PSHx:                                                                                           

23:51 Appendectomy;                                                                           as6 

                                                                                                  

- Immunization history:: Client reports receiving the 2nd dose of the Covid vaccine.              

- Social history:: Smoking status: Patient denies any tobacco usage or history of.                

                                                                                                  

                                                                                                  

Screenin/20                                                                                             

02:33 [facility] ED Fall Risk Assessment (Adult) Score/Fall Risk Level 0 - 2 = Low Risk. Abuse  as6 

      screen: Denies threats or abuse. Denies injuries from another. Nutritional screening:       

      No deficits noted. Tuberculosis screening: No symptoms or risk factors identified.          

                                                                                                  

Assessment:                                                                                       

01:30 General: Appears in no apparent distress. Behavior is calm, cooperative, Reports chills as6 

      for fever for. Pain: Complains of pain in right axilla. Neuro: Level of Consciousness       

      is awake, alert, obeys commands, Oriented to person, place, time, situation, Reports        

      headache. Cardiovascular: Capillary refill < 3 seconds Patient's skin is warm and dry.      

      Respiratory: Respiratory effort is even, unlabored. GI: Reports diarrhea, nausea. Derm:     

      Abscess located on right axilla is quarter sized, is red, is raised.                        

                                                                                                  

Vital Signs:                                                                                      

                                                                                             

23:47  / 87; Pulse 93; Resp 18 S; Temp 100.4(O); Pulse Ox 99% on R/A; Weight 136.08 kg  as6 

      (R); Height 6 ft. 3 in. (R); Pain 5/10;                                                     

                                                                                             

01:32  / 73; Pulse 83; Resp 18 S; Pulse Ox 100% on R/A;                                 as6 

02:34  / 76; Pulse 82; Resp 18 S; Temp 99.7(O); Pulse Ox 100% on R/A;                   as6 

                                                                                             

23:47 Body Mass Index 37.50 (136.08 kg, 190.5 cm)                                             as6 

                                                                                             

23:47 Pain Scale: Adult                                                                       as6 

                                                                                                  

ED Course:                                                                                        

                                                                                             

23:42 Patient arrived in ED.                                                                  jj6 

23:51 Triage completed.                                                                       as6 

23:52 Arm band placed on.                                                                     as6 

23:54 Olvin Guillaume PA is PHCP.                                                                cp  

23:54 Robert Acevedo DO is Attending Physician.                                                cp  

                                                                                             

00:16 Dallin Hopkins, RN is Primary Nurse.                                                    as6 

00:22 Inserted saline lock: 20 gauge in right antecubital area, using aseptic technique.      as6 

      Blood collected.                                                                            

01:52 CT Abd/Pelvis - IV Contrast Only In Process Unspecified.                                EDMS

02:15 Vitor Mccrary MD is Referral Physician.                                               cp  

02:33 Placed in gown. Bed in low position. Call light in reach. Side rails up X 1.            as6 

02:33 Assist provider with I \T\ D: of an abscess on right axilla Set up I\T\D tray. Performed by as
6

      Olvin MAE Wound packed. iodoform gauze, Patient tolerated well. IV discontinued,        

      intact, bleeding controlled, No redness/swelling at site. Pressure dressing applied.        

                                                                                                  

Administered Medications:                                                                         

                                                                                             

02:25 Drug: Potassium PO Effervescent Tablet 25 mEq Route: PO;                                as6 

                                                                                             

02:36 Follow up: Response: No adverse reaction                                                as6 

00:27 Drug: NS 0.9% IV 1000 ml Route: IV; Rate: 1 bolus; Site: right antecubital;             as6 

02:35 Follow up: Response: No adverse reaction; IV Status: Completed infusion; IV Intake:     as6 

      1000ml                                                                                      

00:27 Drug: Ondansetron IVP 4 mg Route: IVP; Site: right antecubital;                         as6 

02:35 Follow up: Response: No adverse reaction                                                as6 

00:55 Drug: Acetaminophen PO 1000 mg Route: PO;                                               as6 

02:36 Follow up: Response: No adverse reaction                                                as6 

01:04 Drug: Lidocaine-Epinephrine Infiltration -1%: (1:100,000) 10 ml {Note: administered by  as6 

      provider .} Volume: 20 ml; Route: Infiltration;                                             

02:35 Follow up: Response: No adverse reaction                                                as6 

01:04 Drug: Bupivacaine Infiltration (0.5 %) 10 ml {Note: administered by provider .} Volume: as6 

      10 ml; Route: Infiltration;                                                                 

02:36 Follow up: Response: No adverse reaction                                                as6 

01:08 Drug: morphine IVP or IV 4 mg Route: IVP; Infused Over: 4 mins; Site: right antecubital;as6 

02:36 Follow up: Response: No adverse reaction                                                as6 

02:25 Drug: Clindamycin  mg Route: PO;                                                  as6 

02:36 Follow up: Response: No adverse reaction                                                as6 

02:25 Drug: Trimethoprim-Sulfamethoxazole PO (160 mg-800 mg (DS) 1 tablet Route: PO;          as6 

02:36 Follow up: Response: No adverse reaction                                                as6 

                                                                                                  

                                                                                                  

Medication:                                                                                       

02:33 VIS not applicable for this client.                                                     as6 

                                                                                                  

Intake:                                                                                           

02:35 IV: 1000ml; Total: 1000ml.                                                              as6 

                                                                                                  

Outcome:                                                                                          

02:16 Discharge ordered by MD.                                                                cp  

02:33 Discharged to home ambulatory, with family.                                             as6 

02:33 Condition: stable                                                                           

02:33 Discharge instructions given to patient, family, Instructed on discharge instructions,      

      follow up and referral plans. medication usage, wound care, Demonstrated understanding      

      of instructions, follow-up care, medications, wound care, Prescriptions given X 4.          

02:36 Patient left the ED.                                                                    as6 

                                                                                                  

Signatures:                                                                                       

Dispatcher [employer]                           EDMS                                                 

Olvin Guillaume PA PA cp Jeffries, Jennifer jj6 Slawson, Ashby, RN                      RN   as6                                                  

                                                                                                  

**************************************************************************************************

## 2023-03-20 NOTE — RAD REPORT
EXAM DESCRIPTION:  Abdomen    Pelvis W Contrast 3/20/2023 2:00 AM CDT

 

CLINICAL HISTORY:  22 years, Male, ABD PAIN

 

COMPARISON:  None.

 

TECHNIQUE:  Contrast-enhanced images of the abdomen and pelvis were performed utilizing 5 mm slice th
ickness at 5 mm interval reconstruction from the lung bases to the ischial tuberosities after the adm
inistration of IV contrast.

In addition multiplanar reformats in the coronal and sagittal plane were obtained and reviewed.

This exam was performed according to our departmental dose-optimization protocol, which includes auto
mated exposure control, adjustment of the mA and/or kV according to patient size and/or use of iterat
chidi reconstruction technique.

 

FINDINGS:  The lung bases demonstrate to be clear.

The liver, gallbladder, pancreas, spleen and adrenal glands demonstrate to be unremarkable, no focal 
lesions are noted.

The kidneys demonstrate normal uptake of contrast media. No evidence for nephrolithiasis and/or hydro
nephrosis.

Grossly the unopacified stomach, small bowel and large bowel demonstrate to be within normal limits. 
  There is no evidence for bowel dilatation/or free air. Surgical suture within the right lower quadr
ant/cecum could correspond to previous appendectomy. The left side colon demonstrate to be unremarkab
le.

The urinary bladder demonstrate to be unremarkable.   The prostate gland is normal.   The aorta demon
strate to be normal.   There is no retroperitoneal lymphadenopathy. There is no evidence for ascites/
or significant abnormal fluid collections. The rest of the soft tissue and bony structures are within
 normal limits.

 

IMPRESSION:  No acute intra-abdominal process.

Previous appendectomy.

 

Electronically signed by:   Be Caceres MD   3/20/2023 2:04 AM CDT Workstation: [id number]

 

 

 

Due to temporary technical issues with the PACS/Fluency reporting system, reports are being signed by
 the in house radiologists without review as a courtesy to insure prompt reporting. The interpreting 
radiologist is fully responsible for the content of the report.

## 2023-03-20 NOTE — EDPHYS
Physician Documentation                                                                           

 [facility]                                                                 

Name: Gume Amos                                                                                 

Age: 22 yrs                                                                                       

Sex: Male                                                                                         

: 2000                                                                                   

MRN: [id number]                                                                                   

Arrival Date: 2023                                                                          

Time: 23:42                                                                                       

Account#: [id number]                                                                            

Bed 15                                                                                            

Private MD:                                                                                       

ED Physician Robert Acevedo                                                                         

HPI:                                                                                              

                                                                                             

23:55 This 22 yrs old  Male presents to ER via Ambulatory with complaints of          cp  

      Nausea/Vomiting/Diarrhea, Abscess, Allergy Symptoms.                                        

23:55 The patient presents to the emergency department with nausea, that is moderate,         cp  

      abdominal pain, of the epigastric area, right upper quadrant and left upper quadrant.       

23:55 Onset: The symptoms/episode began/occurred today. Associated signs and symptoms:        cp  

      Pertinent positives: diarrhea, fever, Pertinent negatives: constipation, active             

      vomiting. Severity of symptoms: in the emergency department the symptoms are unchanged      

      despite home interventions. Patient also c/o pain and abscess to right axilla that          

      started couple days ago.                                                                    

                                                                                                  

Historical:                                                                                       

- Allergies:                                                                                      

23:51 No Known Allergies;                                                                     as6 

- PMHx:                                                                                           

23:51 None;                                                                                   as6 

- PSHx:                                                                                           

23:51 Appendectomy;                                                                           as6 

                                                                                                  

- Immunization history:: Client reports receiving the 2nd dose of the Covid vaccine.              

- Social history:: Smoking status: Patient denies any tobacco usage or history of.                

                                                                                                  

                                                                                                  

ROS:                                                                                              

                                                                                             

00:00 Constitutional: Positive for body aches, chills, fever.                                 cp  

00:00 Eyes: Negative for injury, pain, redness, and discharge.                                cp  

00:00 Cardiovascular: Negative for chest pain, edema, palpitations.                               

00:00 Respiratory: Negative for cough, shortness of breath, wheezing.                             

00:00 Abdomen/GI: Positive for abdominal pain, nausea.                                            

00:00 ENT: Negative for drainage from ear(s), ear pain, sore throat, difficulty swallowing,   cp  

      difficulty handling secretions.                                                             

00:00 Back: Negative for pain at rest, pain with movement.                                        

00:00 Skin: Positive for abscess, of the right axilla.                                            

00:00 Neuro: Negative for altered mental status, dizziness, weakness.                             

00:00 All other systems are negative.                                                         cp  

                                                                                                  

Exam:                                                                                             

00:05 Constitutional: The patient appears in no acute distress, alert, awake, non-toxic, well cp  

      developed, well nourished, febrile, obese.                                                  

00:05 Head/Face:  Normocephalic, atraumatic.                                                  cp  

00:05 Eyes: Periorbital structures: appear normal, Conjunctiva: normal, no exudate, no            

      injection, Sclera: no appreciated abnormality, Lids and lashes: appear normal,              

      bilaterally.                                                                                

00:05 ENT: External ear(s): are unremarkable, Ear canal(s): are normal, clear, TM's:              

      dullness, bilaterally, Nose: is normal, Mouth: Lips: moist, Oral mucosa: moist,             

      Posterior pharynx: Airway: no evidence of obstruction, patent, Tonsils: no enlargement,     

      no exudate, swelling, is not appreciated, erythema, that is mild.                           

00:05 Neck: ROM/movement: is normal, is supple, without pain, no range of motions                 

      limitations, no meningismus, Lymph nodes: no appreciated lymphadenopathy.                   

00:05 Chest/axilla: Inspection: normal, Axilla: abscess, that is small, of the right axilla,      

      with tenderness.                                                                            

00:05 Cardiovascular: Rate: normal, Rhythm: regular.                                              

00:05 Respiratory: the patient does not display signs of respiratory distress,  Respirations:     

      normal, no use of accessory muscles, no retractions, labored breathing, is not present,     

      Breath sounds: are clear throughout, no decreased breath sounds, no stridor, no             

      wheezing.                                                                                   

00:05 Abdomen/GI: Inspection: abdomen appears normal, Bowel sounds: active, all quadrants,        

      Palpation: soft, in all quadrants, mild abdominal tenderness, in the epigastric area,       

      umbilical area, right upper quadrant and left upper quadrant, rebound tenderness, is        

      not appreciated, involuntary guarding, is not appreciated.                                  

00:05 Back: pain, is absent, ROM is normal.                                                       

00:05 Neuro: Orientation: to person, place \T\ time. Mentation: is normal, Motor: moves all       

      fours, strength is normal.                                                                  

                                                                                                  

Vital Signs:                                                                                      

                                                                                             

23:47  / 87; Pulse 93; Resp 18 S; Temp 100.4(O); Pulse Ox 99% on R/A; Weight 136.08 kg  as6 

      (R); Height 6 ft. 3 in. (R); Pain 5/10;                                                     

                                                                                             

01:32  / 73; Pulse 83; Resp 18 S; Pulse Ox 100% on R/A;                                 as6 

02:34  / 76; Pulse 82; Resp 18 S; Temp 99.7(O); Pulse Ox 100% on R/A;                   as6 

                                                                                             

23:47 Body Mass Index 37.50 (136.08 kg, 190.5 cm)                                             as6 

                                                                                             

23:47 Pain Scale: Adult                                                                       as6 

                                                                                                  

Procedures:                                                                                       

01:55 I \T\ D: Incision and drainage was performed for an abscess of the right axilla. Prepped  cp

      with Betadine, Anesthetized with 10 ccs of 50/50 mixture 1% lidocaine with epi and 0.5%     

      marcaine. Incised with #11 blade. Drained small amount purulent fluid. Packed with          

      iodoform gauze, Dressing: sterile 4x4 gauze, the patient tolerated the procedure well.      

                                                                                                  

MDM:                                                                                              

                                                                                             

23:54 Patient medically screened.                                                             cp  

                                                                                             

01:00 Differential diagnosis: Nonspecific abd pain, gastritis, cholecystitis, pancreatitis,   cp  

      appendicitis, viral gastroenteritis, gastroenteritis.                                       

02:15 Data reviewed: vital signs, nurses notes, lab test result(s), radiologic studies, CT    cp  

      scan.                                                                                       

02:15 Consideration of Admission/Observation Escalation of care including                     cp  

      admission/observation considered. I considered the following discharge prescriptions or     

      medication management in the emergency department Medications were administered in the      

      Emergency Department. See MAR. Care significantly affected by the following chronic         

      conditions: Obesity. Counseling: I had a detailed discussion with the patient and/or        

      guardian regarding: the historical points, exam findings, and any diagnostic results        

      supporting the discharge/admit diagnosis, lab results, radiology results, the need for      

      outpatient follow up, a general surgeon, to return to the emergency department if           

      symptoms worsen or persist or if there are any questions or concerns that arise at          

      home. Response to treatment: the patient's symptoms have markedly improved after            

      treatment, and as a result, I will discharge patient.                                       

                                                                                                  

                                                                                             

00:16 Order name: CBC with Diff; Complete Time: 02:02                                         cp  

                                                                                             

02:02 Interpretation: Normal except: HGB 13.3; ALANNA% 83.1; LYM% 11.4; NEUT A 8.3.              cp  

                                                                                             

00:16 Order name: CMP; Complete Time: 02:02                                                   cp  

                                                                                             

02:03 Interpretation: Normal except: K 3.4; GLUC 111; .                                cp  

                                                                                             

00:16 Order name: Lipase; Complete Time: 02:02                                                cp  

                                                                                             

00:16 Order name: Urine Microscopic Only; Complete Time: 02:02                                cp  

                                                                                             

02:03 Interpretation: Normal except: CAOX Cx Many.                                            cp  

                                                                                             

00:33 Order name: Urine Dipstick-Ancillary; Complete Time: 00:50                              EDMS

                                                                                             

01:13 Interpretation: Normal except: UPROT Trace.                                             cp  

                                                                                             

00:50 Order name: COVID-19/FLU A+B/RSV; Complete Time: 02:02                                  cp  

                                                                                             

00:50 Order name: Strep; Complete Time: 02:02                                                 cp  

                                                                                             

01:19 Order name: Throat Culture                                                              EDMS

                                                                                             

00:16 Order name: CT Abd/Pelvis - IV Contrast Only                                            cp  

                                                                                             

00:16 Order name: IV Saline Lock; Complete Time: 00:34                                        cp  

                                                                                             

00:16 Order name: Labs collected and sent; Complete Time: 00:34                               cp  

                                                                                             

00:16 Order name: Urine Dipstick-Ancillary (obtain specimen); Complete Time: 00:34            cp  

                                                                                             

00:16 Order name: I\T\D Setup; Complete Time: 00:34                                             cp

                                                                                                  

Administered Medications:                                                                         

                                                                                             

02:25 Drug: Potassium PO Effervescent Tablet 25 mEq Route: PO;                                as6 

                                                                                             

02:36 Follow up: Response: No adverse reaction                                                as6 

00:27 Drug: NS 0.9% IV 1000 ml Route: IV; Rate: 1 bolus; Site: right antecubital;             as6 

02:35 Follow up: Response: No adverse reaction; IV Status: Completed infusion; IV Intake:     as6 

      1000ml                                                                                      

00:27 Drug: Ondansetron IVP 4 mg Route: IVP; Site: right antecubital;                         as6 

02:35 Follow up: Response: No adverse reaction                                                as6 

00:55 Drug: Acetaminophen PO 1000 mg Route: PO;                                               as6 

02:36 Follow up: Response: No adverse reaction                                                as6 

01:04 Drug: Lidocaine-Epinephrine Infiltration -1%: (1:100,000) 10 ml {Note: administered by  as6 

      provider .} Volume: 20 ml; Route: Infiltration;                                             

02:35 Follow up: Response: No adverse reaction                                                as6 

01:04 Drug: Bupivacaine Infiltration (0.5 %) 10 ml {Note: administered by provider .} Volume: as6 

      10 ml; Route: Infiltration;                                                                 

02:36 Follow up: Response: No adverse reaction                                                as6 

01:08 Drug: morphine IVP or IV 4 mg Route: IVP; Infused Over: 4 mins; Site: right antecubital;as6 

02:36 Follow up: Response: No adverse reaction                                                as 

02:25 Drug: Clindamycin  mg Route: PO;                                                  as 

02:36 Follow up: Response: No adverse reaction                                                as 

02:25 Drug: Trimethoprim-Sulfamethoxazole PO (160 mg-800 mg (DS) 1 tablet Route: PO;           

02:36 Follow up: Response: No adverse reaction                                                as6 

                                                                                                  

                                                                                                  

Disposition:                                                                                      

02:32 Co-signature as Attending Physician, Robert Acevedo DO I was immediately available on-site ms3 

      in the Emergency Department for consultation in the care of the patient.                    

                                                                                                  

Disposition Summary:                                                                              

23 02:16                                                                                    

Discharge Ordered                                                                                 

      Location: Home                                                                          cp  

      Problem: new                                                                            cp  

      Symptoms: have improved                                                                 cp  

      Condition: Stable                                                                       cp  

      Diagnosis                                                                                   

        - Nausea with vomiting, unspecified                                                   cp  

        - Diarrhea, unspecified                                                               cp  

        - Cutaneous abscess of right axilla                                                   cp  

      Followup:                                                                               cp  

        - With: Vitor Mccrary MD                                                                 

        - When: 48 Hours                                                                           

        - Reason: Wound Recheck                                                                    

      Followup:                                                                               cp  

        - With: Private Physician                                                                  

        - When: 2 - 3 days                                                                         

        - Reason: vomiting, diarrhea                                                               

      Discharge Instructions:                                                                     

        - Discharge Summary Sheet                                                             cp  

        - Skin Abscess                                                                        cp  

        - Diarrhea, Adult                                                                     cp  

        - Incision and Drainage                                                               cp  

        - Nausea and Vomiting, Adult                                                          cp  

        - Form - Excuse from Work, School, or Physical Activity                               cp  

      Forms:                                                                                      

        - Medication Reconciliation Form                                                      cp  

        - Thank You Letter                                                                    cp  

        - Antibiotic Education                                                                cp  

        - Prescription Opioid Use                                                             cp  

      Prescriptions:                                                                              

        - Clindamycin HCl 300 mg Oral Capsule                                                      

            - take 1 capsule by ORAL route every 6 hours for 10 days; 40 capsule; Refills: 0, cp  

      Product Selection Permitted                                                                 

        - Ibuprofen 800 mg Oral Tablet                                                             

            - take 1 tablet by ORAL route every 8 hours As needed take with food; 30 tablet;  cp  

      Refills: 0, Product Selection Permitted                                                     

        - Zofran 4 mg Oral Tablet                                                                  

            - take 1 tablet by ORAL route every 12 hours As needed; 6 tablet; Refills: 0,     cp  

      Product Selection Permitted                                                                 

        - Bactrim -160 mg Oral Tablet                                                        

            - take 1 tablet by ORAL route every 12 hours for 10 days; 20 tablet; Refills: 0,  cp  

      Product Selection Permitted                                                                 

Signatures:                                                                                       

Dispatcher [employer]                           EDMS                                                 

Olvin Guillaume PA                         PA   Robert Castellanos DO DO   ms3                                                  

Slawson, [place], RN                      RN   as6                                                  

                                                                                                  

Corrections: (The following items were deleted from the chart)                                    

00:43 00:28 Abdomen ordered. EDMS                                                             EDMS

                                                                                                  

**************************************************************************************************

## 2023-03-21 ENCOUNTER — HOSPITAL ENCOUNTER (EMERGENCY)
Dept: HOSPITAL 97 - ER | Age: 23
Discharge: HOME | End: 2023-03-21
Payer: COMMERCIAL

## 2023-03-21 VITALS — OXYGEN SATURATION: 100 % | SYSTOLIC BLOOD PRESSURE: 127 MMHG | DIASTOLIC BLOOD PRESSURE: 67 MMHG | TEMPERATURE: 98.1 F

## 2023-03-21 DIAGNOSIS — L02.411: Primary | ICD-10-CM

## 2023-03-21 PROCEDURE — 99282 EMERGENCY DEPT VISIT SF MDM: CPT

## 2023-03-21 NOTE — ER
Nurse's Notes                                                                                     

 [facility]                                                                 

Name: Gume Amos                                                                                 

Age: 22 yrs                                                                                       

Sex: Male                                                                                         

: 2000                                                                                   

MRN: [id number]                                                                                   

Arrival Date: 2023                                                                          

Time: 15:07                                                                                       

Account#: [id number]                                                                            

Bed 9                                                                                             

Private MD:                                                                                       

Diagnosis: Cutaneous abscess of right axilla-SP INCISED AND DRAINED                               

                                                                                                  

Presentation:                                                                                     

                                                                                             

15:59 Chief complaint: Patient states: had an I \T\ D done on right axilla , was packed with    iw

      gauze , was told to come back, he could not get into to see doctor today. Coronavirus       

      screen: At this time, the client does not indicate any symptoms associated with             

      coronavirus-19. Ebola Screen: Patient negative for fever greater than or equal to 101.5     

      degrees Fahrenheit, and additional compatible Ebola Virus Disease symptoms Patient          

      denies exposure to infectious person. Patient denies travel to an Ebola-affected area       

      in the 21 days before illness onset. No symptoms or risks identified at this time.          

      Initial Sepsis Screen: Does the patient meet any 2 criteria? No. Patient's initial          

      sepsis screen is negative. Does the patient have a suspected source of infection? No.       

      Patient's initial sepsis screen is negative. Risk Assessment: Do you want to hurt           

      yourself or someone else? Patient reports no desire to harm self or others. Onset of        

      symptoms.                                                                                   

15:59 Method Of Arrival: Ambulatory                                                           iw  

15:59 Acuity: BOUBACAR 4                                                                           iw  

                                                                                                  

Historical:                                                                                       

- Allergies:                                                                                      

16:01 No Known Allergies;                                                                     iw  

- PSHx:                                                                                           

16:01 Appendectomy;                                                                           iw  

                                                                                                  

                                                                                                  

                                                                                                  

Vital Signs:                                                                                      

15:59  / 67; Pulse 68; Resp 16; Temp 98.1; Pulse Ox 100% on R/A; Weight 158.76 kg;      iw  

                                                                                                  

ED Course:                                                                                        

15:07 Patient arrived in ED.                                                                  rg4 

15:10 Olvin Waldron MD is Attending Physician.                                             [facility] 

16:00 Triage completed.                                                                       iw  

16:05 Rupa De Paz, RN is Primary Nurse.                                                     

17:16 Jer Sorenson MD is Referral Physician.                                              [facility] 

                                                                                                  

Administered Medications:                                                                         

17:29 Drug: Trimethoprim-Sulfamethoxazole PO (160 mg-800 mg (DS) 1 tablet Route: PO;            

                                                                                                  

                                                                                                  

Outcome:                                                                                          

17:16 Discharge ordered by MD.                                                                [facility] 

17:41 Patient left the ED.                                                                      

                                                                                                  

Signatures:                                                                                       

Olvin Waldron MD MD cha Williams, Irene, RN                     RN   bran Lay, Lorena                                 rg4                                                  

                                                                                                  

**************************************************************************************************

## 2023-03-21 NOTE — EDPHYS
Physician Documentation                                                                           

 [facility]                                                                 

Name: Gume Amos                                                                                 

Age: 22 yrs                                                                                       

Sex: Male                                                                                         

: 2000                                                                                   

MRN: [id number]                                                                                   

Arrival Date: 2023                                                                          

Time: 15:07                                                                                       

Account#: [id number]                                                                            

Bed 9                                                                                             

Private MD:                                                                                       

ED Physician Olvin Waldron                                                                      

HPI:                                                                                              

                                                                                             

17:13 This 22 yrs old  Male presents to ER via Ambulatory with complaints of Abscess  loni 

      Recheck.                                                                                    

17:13 Patient presents to ED for recheck of: abscess. The affected area is on the right       loni 

      axilla. Previous treatment: The patient was initially treated 3 day(s) ago. Progress:       

      The patient reports excellent improvement in the affected area. There has been              

      resolution, improvement, or non-development of any drainage, fever, pain, redness or        

      swelling. The patient has not experienced similar symptoms in the past.                     

                                                                                                  

Historical:                                                                                       

- Allergies:                                                                                      

16:01 No Known Allergies;                                                                     iw  

- PSHx:                                                                                           

16:01 Appendectomy;                                                                           iw  

                                                                                                  

                                                                                                  

                                                                                                  

ROS:                                                                                              

17:14 Constitutional: Negative for fever, chills, and weight loss, Eyes: Negative for injury, loni 

      pain, redness, and discharge, ENT: Negative for injury, pain, and discharge, Neck:          

      Negative for injury, pain, and swelling, Cardiovascular: Negative for chest pain,           

      palpitations, and edema, Respiratory: Negative for shortness of breath, cough,              

      wheezing, and pleuritic chest pain, Abdomen/GI: Negative for abdominal pain, nausea,        

      vomiting, diarrhea, and constipation, Back: Negative for injury and pain, : Negative      

      for injury, bleeding, discharge, and swelling, Skin: Negative for injury, rash, and         

      discoloration, Neuro: Negative for headache, weakness, numbness, tingling, and seizure,     

      Psych: Negative for depression, anxiety, suicide ideation, homicidal ideation, and          

      hallucinations, Allergy/Immunology: Negative for hives, rash, and allergies, Endocrine:     

      Negative for neck swelling, polydipsia, polyuria, polyphagia, and marked weight             

      changes, Hematologic/Lymphatic: Negative for swollen nodes, abnormal bleeding, and          

      unusual bruising.                                                                           

17:14 MS/extremity: Positive for pain, tenderness, of the right axilla.                           

                                                                                                  

Exam:                                                                                             

17:14 Constitutional:  This is a well developed, well nourished patient who is awake, alert,  loni 

      and in no acute distress. Head/Face:  Normocephalic, atraumatic. Eyes:  Pupils equal        

      round and reactive to light, extra-ocular motions intact.  Lids and lashes normal.          

      Conjunctiva and sclera are non-icteric and not injected.  Cornea within normal limits.      

      Periorbital areas with no swelling, redness, or edema. ENT:  Nares patent. No nasal         

      discharge, no septal abnormalities noted.  Tympanic membranes are normal and external       

      auditory canals are clear.  Oropharynx with no redness, swelling, or masses, exudates,      

      or evidence of obstruction, uvula midline.  Mucous membranes moist. Neck:  Trachea          

      midline, no thyromegaly or masses palpated, and no cervical lymphadenopathy.  Supple,       

      full range of motion without nuchal rigidity, or vertebral point tenderness.  No            

      Meningismus. Chest/axilla:  Normal chest wall appearance and motion.  Nontender with no     

      deformity.  No lesions are appreciated. Cardiovascular:  Regular rate and rhythm with a     

      normal S1 and S2.  No gallops, murmurs, or rubs.  Normal PMI, no JVD.  No pulse             

      deficits. Respiratory:  Lungs have equal breath sounds bilaterally, clear to                

      auscultation and percussion.  No rales, rhonchi or wheezes noted.  No increased work of     

      breathing, no retractions or nasal flaring. Abdomen/GI:  Soft, non-tender, with normal      

      bowel sounds.  No distension or tympany.  No guarding or rebound.  No evidence of           

      tenderness throughout. Back:  No spinal tenderness.  No costovertebral tenderness.          

      Full range of motion. Male :  Normal genitalia with no discharge or lesions. Neuro:       

      Awake and alert, GCS 15, oriented to person, place, time, and situation.  Cranial           

      nerves II-XII grossly intact.  Motor strength 5/5 in all extremities.  Sensory grossly      

      intact.  Cerebellar exam normal.  Normal gait. Psych:  Awake, alert, with orientation       

      to person, place and time.  Behavior, mood, and affect are within normal limits.            

17:14 Skin: abscess, that is small, with drainage, that is serosanguinous, cellulitis, is not     

      appreciated, induration, that is mild is noted.                                             

                                                                                                  

Vital Signs:                                                                                      

15:59  / 67; Pulse 68; Resp 16; Temp 98.1; Pulse Ox 100% on R/A; Weight 158.76 kg;      iw  

                                                                                                  

MDM:                                                                                              

15:10 Patient medically screened.                                                             [facility] 

                                                                                                  

                                                                                             

17:13 Order name: Wound Care; Complete Time: 17:41                                            loni 

                                                                                                  

Administered Medications:                                                                         

17:29 Drug: Trimethoprim-Sulfamethoxazole PO (160 mg-800 mg (DS) 1 tablet Route: PO;          iw  

                                                                                                  

                                                                                                  

Disposition Summary:                                                                              

23 17:16                                                                                    

Discharge Ordered                                                                                 

      Location: Home                                                                          loni 

      Problem: new                                                                            loni 

      Symptoms: have improved                                                                 loni 

      Condition: Stable                                                                       loni 

      Diagnosis                                                                                   

        - Cutaneous abscess of right axilla - SP INCISED AND DRAINED                          loni 

      Followup:                                                                               loni 

        - With: Private Physician                                                                  

        - When: 2 - 3 days                                                                         

        - Reason: Recheck today's complaints, Continuance of care, Re-evaluation by your           

      physician                                                                                   

      Followup:                                                                               loni 

        - With: Jer Sorenson MD                                                                

        - When: 2 - 3 days                                                                         

        - Reason: Recheck today's complaints, Re-evaluation by your physician                      

      Discharge Instructions:                                                                     

        - Discharge Summary Sheet                                                             loni 

        - Skin Abscess                                                                        loni 

        - Incision and Drainage                                                               loni 

        - Skin Abscess, Easy-to-Read                                                          loni 

        - Incision and Drainage, Care After                                                   loni 

      Forms:                                                                                      

        - Medication Reconciliation Form                                                      loni 

        - Thank You Letter                                                                    loni 

        - Antibiotic Education                                                                loni 

        - Prescription Opioid Use                                                             loni 

Signatures:                                                                                       

Olvin Waldron MD MD cha Williams, Irene, RN                     RN   iw                                                   

                                                                                                  

**************************************************************************************************

## 2023-03-21 NOTE — XMS REPORT
Continuity of Care Document

                            Created on:2023



Patient:ROLO MACIAS

Sex:Male

:2000

External Reference #:[id number]





Demographics







                          Address                   [address] [address]

 

                          Home Phone                [phone]

 

                          Mobile Phone              [phone] )

 

                          Email Address             [email or web address]

 

                          Preferred Language        en

 

                          Marital Status            Unknown

 

                          Confucianist Affiliation     Unknown

 

                          Race                      Unknown

 

                          Additional Race(s)        Unavailable



                                                    White

 

                          Ethnic Group              Unknown









Author







                          Organization              [facility]

t

 

                          Address                   [address] [facility] [address] [place] [address]

 

                          Phone                     [phone]









Care Team Providers







                    Name                Role                Phone

 

                    Mikhail Clark DO Attending Clinician [phone]

 

                    Uzair Garcia MD Attending Clinician [phone]

 

                    UZAIR GARCIA    Attending Clinician Unavailable









Payers







           Payer Name Policy Type Policy Number Effective Date Expiration Date DEBORA SETH            [id number]  [id number]            



           HEALTH                           00:00:00              







Problems

This patient has no known problems.



Allergies, Adverse Reactions, Alerts







       Allergy Allergy Status Severity Reaction(s) Onset  Inactive Treating Comm

ents 

Source



       Name   Type                        Date   Date   Clinician        

 

       NO KNOWN Drug   Active                                           [facility]



       ALLERGIE Class                                                   ity of



       S                                                              [facility]







Medications

This patient has no known medications.



Procedures

This patient has no known procedures.



Encounters







        Start   End     Encounter Admission Attending Care    Care    Encounter 

Source



        Date/Time Date/Time Type    Type    Clinicians Facility Department ID   

   

 

        2023 Outpatient                 SFA     [facility]     [id number]

[address]



        09:12:21 09:12:21                                         0320    F



                                                                        [place]

 

        2021 Patient         JOSE DANIEL Clark    [id number]

[id number] 



        00:00:00 00:00:00 Outreach         Mikhail  PRIMARY [id number]         



                                        [place]  [facility]    [id number]         



                                                [place] [phone]         



                                                        [address]             

 

        2020 Telephone         Jose    [facility]    [id number]

2209 



        00:00:00 00:00:00                 Uzair [facility] [id number]     

    



                                                BAY     [id number]         



                                                [place]  [phone]         



                                                        156             

 

        2020 Telephone         [facility]    [id number]

6579 



        00:00:00 00:00:00                 Uzair [facility] [id number]     

    



                                                BAY     [id number]         



                                                [place]  [phone]         



                                                        156             

 

        2020 Telephone         [facility]    [phone]

4770 



        00:00:00 00:00:00                 Uzair [facility] 350.1.13.10     

    



                                                BAY     [id number]         



                                                [place]  [phone]         



                                                        156             

 

        2020 Outpatient R       [facility]    [id number]

[id number]



        08:10:00 08:10:00                 UZAIR salter [facility]







Results







           Test Description Test Time  Test Comments Results    Result Comments 

Source









                    CBC W/AUTO DIFF WITH PLATELETS 2022 06:22:27 









                      Test Item  Value      Reference Range Interpretation Comme

nts









             WBC (test code = 1001) 6.6 K/UL     3.5-11.0                  

 

             RBC (test code = 1002) 4.79 M/UL    4.50-6.10                 

 

             HEMOGLOBIN (test code = 1003) 13.2 G/DL    13.5-17.0    L          

  

 

             HEMATOCRIT (test code = 1004) 39.9 %       40.0-51.0    L          

  

 

             MCV (test code = 1005) 83.3 fL      80.0-99.0                 

 

             MCH (test code = 1006) 27.6 PG      25.0-33.0                 

 

             MCHC (test code = 1007) 33.1 G/DL    31.0-36.0                 

 

             RDW (test code = 1038) 12.4 %       11.5-15.0                 

 

             NEUTROPHILS (test code = 61.0 %                                 



             1008)                                               

 

             LYMPHOCYTES (test code = 28.4 %                                 



             1010)                                               

 

             MONOCYTES (test code = 1011) 8.2 %                                 

 

 

             EOSINOPHILS (test code = 1.4 %                                  



             1012)                                               

 

             BASOPHILS (test code = 1013) 0.8 %                                 

 

 

             IMMATURE GRANULOCYTES (test 0.2 %                                  



             code = 1036)                                        

 

             NUCLEATED RBCS (test code = 0.0 /100 WBC'S See_Comment             

   [Automated message] The



             1065)                                               system which ge

nerated this



                                                                 result transmit

david



                                                                 reference range

: 0.0. The



                                                                 reference range

 was not



                                                                 used to interpr

et this



                                                                 result as pancho

l/abnormal.

 

             PLATELET COUNT (test code = 357 K/UL     130-400                   



             1015)                                               

 

             ABSOLUTE NEUTROPHILS (test 4.01 K/UL    1.50-7.50                 



             code = 1066)                                        

 

             ABSOLUTE LYMPHOCYTES (test 1.86 K/UL    1.00-4.00                 



             code = 1067)                                        

 

             ABSOLUTE MONOCYTES (test code 0.54 K/UL    0.20-1.00               

  



             = 1068)                                             

 

             ABSOLUTE EOSINOPHILS (test 0.09 K/UL    0.00-0.50                 



             code = 1040)                                        

 

             ABSOLUTE BASOPHILS (test code 0.05 K/UL    0.00-0.20               

  



             = 1069)                                             

 

             ABS IMMATURE GRANULOCYTES 0.01 K/UL    0.00-0.10                 



             (test code = 1020)                                        

 

             ABS NUCLEATED RBCS (test code 0.00 K/UL    0.00-0.11               

  



             = 59550)                                            



COMPREHENSIVE METABOLIC [id number] 04:17:29





             Test Item    Value        Reference Range Interpretation Comments

 

             GLUCOSE (test code = 84 MG/DL     70-99                     



             )                                               

 

             BUN (test code = 12 MG/DL     6-20                      



             )                                               

 

             CREATININE (test 0.75 MG/DL   0.80-1.40    L            



             code = 2214)                                        

 

             eGFR ( CKD-EPI) 132          >60                       



             (test code = 91182) ML/MIN/1.73                            

 

             CALC BUN/CREAT (test 16 RATIO     6-28                      



             code = 2235)                                        

 

             SODIUM (test code = 143 MEQ/L    133-146                   



             )                                               

 

             POTASSIUM (test code 4.2 MEQ/L    3.5-5.4                   



             = 222)                                             

 

             CHLORIDE (test code 103 MEQ/L                        



             = 2215)                                             

 

             CARBON DIOXIDE (test 26 MEQ/L     19-31                     



             code = 2206)                                        

 

             CALCIUM (test code = 9.7 MG/DL    8.5-10.5                  



             )                                               

 

             PROTEIN, TOTAL (test 7.4 G/DL     6.1-8.3                   



             code = 2229)                                        

 

             ALBUMIN (test code = 4.6 G/DL     3.5-5.2                   



             )                                               

 

             CALC GLOBULIN (test 2.8 G/DL     1.9-3.7                   



             code = 2240)                                        

 

             CALC A/G RATIO (test 1.6 RATIO    1.0-2.6                   



             code = 2234)                                        

 

             BILIRUBIN, TOTAL 0.4 MG/DL    See_Comment                [Automated

 message]



             (test code = 2207)                                        The [employer] which



                                                                 generated this



                                                                 result transmit

david



                                                                 reference range

:



                                                                 <=1.2. The refe

rence



                                                                 range was not u

sed



                                                                 to interpret th

is



                                                                 result as



                                                                 normal/abnormal

.

 

             ALKALINE PHOSPHATASE 197 U/L             H            



             (test code = )                                        

 

             AST (test code = 20 U/L       50                      



             )                                               

 

             ALT (test code = 26 U/L       50                      



             )                                               



LIPID [id number] 04:17:29





             Test Item    Value        Reference Range Interpretation Comments

 

             CHOLESTEROL (test 92 MG/DL     <200                      



             code = 2210)                                        

 

             TRIGLYCERIDES (test 161 MG/DL    <150         H            



             code = 2232)                                        

 

             HDL CHOLESTEROL (test 32 MG/DL     >39          L            



             code = 2220)                                        

 

             CALC LDL CHOL (test 36 MG/DL     <100                       NOTE: C

ALCULATED LDL



             code = 2237)                                        IS BASED ON



                                                                 CHERELLE-CORDON 

METHOD



                                                                 WHICHINCLUDES



                                                                 ADJUSTABLE



                                                                 TRIGLYCERIDE:VL

DL



                                                                 CHOLESTEROL RAT

IO.THIS



                                                                 FACTOR VARIES B

Y



                                                                 MEASURED TRIGLY

CERIDE



                                                                 AND NON-HDLCHOL

ESTEROL



                                                                 CONCENTRATIONS 

WITH



                                                                 INCREASED CALCU

LATED



                                                                 LDL SEENIN HIGH

ER



                                                                 TRIGLYCERIDE OR

 LOWER



                                                                 NON-HDL SPECIME

NS. FOR



                                                                 MOREINFORMATION

, SEE



                                                                 CLIENT ANNOUNCE

MENT AT



                                                                 [email or web address]

[email or web address]



                                                                 /[email or web address]

 

             RISK RATIO LDL/HDL 1.13 RATIO   <3.55                      UNLESS O

THERWISE



             (test code = 223)                                        INDICATED

, ALL TESTING



                                                                 PERFORMED [facility]



                                                                 PATHOLOGY



                                                                 LABORATORIES, [facility].



                                                                 [address] [place] [address] MELLISSA MONTE



                                                                 DIRECTOR: LAURA HAIDER M.D.

 CLIA



                                                                 NUMBER [id number]

[id number]



                                                                 ACCREDITATION N

O.



                                                                 [id number]